# Patient Record
Sex: MALE | Race: WHITE | URBAN - METROPOLITAN AREA
[De-identification: names, ages, dates, MRNs, and addresses within clinical notes are randomized per-mention and may not be internally consistent; named-entity substitution may affect disease eponyms.]

---

## 2022-03-03 ENCOUNTER — OFFICE VISIT (OUTPATIENT)
Dept: SURGERY | Facility: CLINIC | Age: 32
End: 2022-03-03
Payer: COMMERCIAL

## 2022-03-03 VITALS
HEIGHT: 71 IN | WEIGHT: 162 LBS | TEMPERATURE: 97 F | BODY MASS INDEX: 22.68 KG/M2 | HEART RATE: 81 BPM | DIASTOLIC BLOOD PRESSURE: 78 MMHG | SYSTOLIC BLOOD PRESSURE: 126 MMHG

## 2022-03-03 DIAGNOSIS — K64.4 SKIN TAG OF PERIANAL REGION: ICD-10-CM

## 2022-03-03 DIAGNOSIS — L29.9 PRURITUS: Primary | ICD-10-CM

## 2022-03-03 PROCEDURE — 99204 OFFICE O/P NEW MOD 45 MIN: CPT | Performed by: COLON & RECTAL SURGERY

## 2022-03-03 PROCEDURE — 3008F BODY MASS INDEX DOCD: CPT | Performed by: COLON & RECTAL SURGERY

## 2022-03-03 PROCEDURE — 3078F DIAST BP <80 MM HG: CPT | Performed by: COLON & RECTAL SURGERY

## 2022-03-03 PROCEDURE — 46600 DIAGNOSTIC ANOSCOPY SPX: CPT | Performed by: COLON & RECTAL SURGERY

## 2022-03-03 PROCEDURE — 3074F SYST BP LT 130 MM HG: CPT | Performed by: COLON & RECTAL SURGERY

## 2022-09-22 ENCOUNTER — OFFICE VISIT (OUTPATIENT)
Facility: LOCATION | Age: 32
End: 2022-09-22

## 2022-09-22 VITALS — HEART RATE: 74 BPM | TEMPERATURE: 97 F

## 2022-09-22 DIAGNOSIS — K64.2 PROLAPSED INTERNAL HEMORRHOIDS, GRADE 3: ICD-10-CM

## 2022-09-22 DIAGNOSIS — L29.0 PRURITUS ANI: Primary | ICD-10-CM

## 2022-09-22 DIAGNOSIS — B36.9 FUNGAL DERMATITIS: ICD-10-CM

## 2022-09-22 PROCEDURE — 99214 OFFICE O/P EST MOD 30 MIN: CPT | Performed by: COLON & RECTAL SURGERY

## 2022-09-22 PROCEDURE — 46600 DIAGNOSTIC ANOSCOPY SPX: CPT | Performed by: COLON & RECTAL SURGERY

## 2022-09-22 RX ORDER — FLUCONAZOLE 200 MG/1
200 TABLET ORAL DAILY
Qty: 14 TABLET | Refills: 0 | Status: SHIPPED | OUTPATIENT
Start: 2022-09-22 | End: 2022-10-06

## 2022-09-23 PROBLEM — L29.0 PRURITUS ANI: Status: ACTIVE | Noted: 2022-09-23

## 2022-09-23 PROBLEM — B36.9 FUNGAL DERMATITIS: Status: ACTIVE | Noted: 2022-09-23

## 2022-09-23 PROBLEM — K64.2 PROLAPSED INTERNAL HEMORRHOIDS, GRADE 3: Status: ACTIVE | Noted: 2022-09-23

## 2022-09-23 NOTE — PATIENT INSTRUCTIONS
I am seeing this patient in consultation regarding very severe perianal itching. This patient has had multiple procedures and interventions on the anus. He presents at this time for further opinion regarding all of his issues. This patient states that 3 weeks ago he began with very severe pain and staining in the perianal skin. He is being treated with abdominal bloating by the gastroenterology service. They prescribed Xifaxan for irritable bowel syndrome. He states that that is about the time. His perianal itching began. He has a history of prior fissure with anal sphincteroplasty performed. He has a history of prior rubber band treatments to the internal hemorrhoids grade 3. He has had itching and redness around the anus on prior occasion. His stinging and \"ripping\" sensation happen while he is wiping. He has no current diarrhea or constipation. He has no bright red blood per rectum or melena. He has no mucus in the stool or narrowing of the stool. He has no current abdominal pain or distention. He has not suffered weight loss as a symptom. He is 29years old and being evaluated by gastroenterologist, I will leave colonic surveillance up to the gastroenterology service. He has not had any prior surgery for anal or rectal abscess. He has never had an anal fistula. He has never been diagnosed with Crohn's disease, ulcerative colitis, or irritable bowel syndrome. He has no issues with heart attack, stroke, cardiac arrhythmia, heart murmur, or heart valve replacement. He is not on any blood thinners. Clinical exam including anoscopy reveals him to have a very severe and advanced fungal dermatitis that extends approximately 1.5 inches in all directions from the anal verge. There are no external hemorrhoids. There is no current fissure or fistula. There are no abscesses. Prostate is normal.  He has had recurrence of his grade 3 internal hemorrhoids.   There is no proctitis or Crohn's disease. I explained all the above to the patient in detail. We will start with Diflucan 200 mg for 14-day course. This should improve all of his perianal itching and clear the fungal dermatitis. I have given him extensive dietary and hygienic modifications that should also improve his pruritus ani symptoms. I will see him again on October 11, 2022. We will note his progress. If he is having trouble clearing the pruritus ani, I recommend rubber band therapy to his hemorrhoids. This will take 4 visits spaced 2 weeks apart.

## 2022-10-11 ENCOUNTER — OFFICE VISIT (OUTPATIENT)
Facility: LOCATION | Age: 32
End: 2022-10-11
Payer: COMMERCIAL

## 2022-10-11 DIAGNOSIS — L29.0 PRURITUS ANI: Primary | ICD-10-CM

## 2022-10-11 DIAGNOSIS — K64.2 PROLAPSED INTERNAL HEMORRHOIDS, GRADE 3: ICD-10-CM

## 2022-10-11 DIAGNOSIS — B36.9 FUNGAL DERMATITIS: ICD-10-CM

## 2022-10-11 PROCEDURE — 99213 OFFICE O/P EST LOW 20 MIN: CPT | Performed by: COLON & RECTAL SURGERY

## 2022-10-11 RX ORDER — FLUCONAZOLE 200 MG/1
200 TABLET ORAL DAILY
Qty: 7 TABLET | Refills: 1 | Status: SHIPPED | OUTPATIENT
Start: 2022-10-11 | End: 2022-10-18

## 2022-10-11 NOTE — PATIENT INSTRUCTIONS
The patient presents for continued care and evaluation regarding his perianal fungal dermatitis and perianal pruritus. At the patient's last visit, we started him on a 14-day course of Diflucan regarding his perianal fungal dermatitis. The patient states he feels about 90% better at today's visit. He states he has finished his Diflucan. He states at baseline, he had some perianal discomfort. He states he is tolerating a general diet. He is having regular bowel movements. He denies diarrhea or constipation. He states he has not been following the pruritus ani diet strictly. He was unaware this would help alleviate his symptoms. Clinical exam was limited to an external exam only. He has no external hemorrhoids, fissures, fistulae or abscesses. He has a small remaining area of perianal fungal dermatitis at the 12:00 location. We will extend the patient's Diflucan in additional 7 days. We will provide him with 1 refill in case his symptoms persist.    I expressed the importance of avoiding highly acidic foods to help alleviate his symptoms. I have no further follow-up scheduled with this patient at this time. If his symptoms persist, he can make an additional follow-up appointment. All questions and concerns were addressed.

## 2022-12-06 ENCOUNTER — OFFICE VISIT (OUTPATIENT)
Facility: LOCATION | Age: 32
End: 2022-12-06
Payer: COMMERCIAL

## 2022-12-06 DIAGNOSIS — K64.1 GRADE II HEMORRHOIDS: Primary | ICD-10-CM

## 2022-12-06 PROCEDURE — 99213 OFFICE O/P EST LOW 20 MIN: CPT | Performed by: COLON & RECTAL SURGERY

## 2022-12-06 PROCEDURE — 46600 DIAGNOSTIC ANOSCOPY SPX: CPT | Performed by: COLON & RECTAL SURGERY

## 2022-12-07 PROBLEM — K64.1 GRADE II HEMORRHOIDS: Status: ACTIVE | Noted: 2022-12-07

## 2022-12-07 NOTE — PATIENT INSTRUCTIONS
The patient presents for continued care and evaluation regarding his perianal fungal dermatitis and perianal pruritus. In September, we started the patient on a 14-day course of Diflucan for his fungal dermatitis. Following his initial visit, he saw him again in October when he said he was feeling approximately 90% better. At today's visit, he states he is concerned about his hemorrhoids. He states he continues to have some rectal irritation following bowel movements. He denies any rectal bleeding or rectal pain. He denies mucus drainage from the rectum. He denies perianal pruritus. The patient states he is having regular bowel movements. He denies diarrhea or constipation. Denies hematochezia, melena or mucus in the stools. The patient states he has increased the fiber in his diet as well as his water intake, which is helped regulate his bowel movements. He states he has been treated with rubber band ligation for his hemorrhoids in the past.    Clinical exam of the rectum including anoscopy reveals the patient's fungal dermatitis and pruritus ani to have entirely healed. He has no external hemorrhoids, fissures, fistulae or abscesses. Anoscopy reveals grade 2 internal hemorrhoids and scarring from previous rubber band ligations. There is no evidence of proctitis or Crohn's disease. Digital rectal exam reveals no palpable masses. Prostate is normal.  Basal tone 4/4 and maximum squeeze pressure 4/4. I explained to the patient that his perianal skin has entirely healed. He may continue to have some discomfort in the region for as long as 6 months following resolution of his fungal dermatitis and pruritus ani. The patient's hemorrhoids are fairly small at this time. They would not be amenable to rubber band ligation. I informed the patient that if his hemorrhoids become more symptomatic in the future, then he may see me for rubber band ligation.     I have no further follow-up scheduled with this patient. He may see me on an as-needed basis in the future.

## 2024-09-25 ENCOUNTER — LAB SERVICES (OUTPATIENT)
Dept: LAB | Age: 34
End: 2024-09-25

## 2024-09-25 ENCOUNTER — WALK IN (OUTPATIENT)
Dept: URGENT CARE | Age: 34
End: 2024-09-25

## 2024-09-25 VITALS
HEIGHT: 71 IN | RESPIRATION RATE: 18 BRPM | DIASTOLIC BLOOD PRESSURE: 73 MMHG | HEART RATE: 71 BPM | SYSTOLIC BLOOD PRESSURE: 118 MMHG | WEIGHT: 191.58 LBS | OXYGEN SATURATION: 99 % | TEMPERATURE: 97.9 F | BODY MASS INDEX: 26.82 KG/M2

## 2024-09-25 DIAGNOSIS — R30.0 DYSURIA: Primary | ICD-10-CM

## 2024-09-25 DIAGNOSIS — Z72.51 HIGH RISK SEXUAL BEHAVIOR, UNSPECIFIED TYPE: ICD-10-CM

## 2024-09-25 DIAGNOSIS — R30.0 DYSURIA: ICD-10-CM

## 2024-09-25 PROBLEM — K64.1 GRADE II HEMORRHOIDS: Status: ACTIVE | Noted: 2022-12-07

## 2024-09-25 PROBLEM — B36.9 FUNGAL DERMATITIS: Status: ACTIVE | Noted: 2022-09-23

## 2024-09-25 PROBLEM — L29.0 PRURITUS ANI: Status: ACTIVE | Noted: 2022-09-23

## 2024-09-25 PROBLEM — K64.2 PROLAPSED INTERNAL HEMORRHOIDS, GRADE 3: Status: ACTIVE | Noted: 2022-09-23

## 2024-09-25 LAB
APPEARANCE, POC: ABNORMAL
BILIRUBIN, POC: NEGATIVE
COLOR, POC: YELLOW
GLUCOSE UR-MCNC: NEGATIVE MG/DL
KETONES, POC: NEGATIVE MG/DL
NITRITE, POC: NEGATIVE
OCCULT BLOOD, POC: NEGATIVE
PH UR: 6 [PH] (ref 5–7)
PROT UR-MCNC: NEGATIVE MG/DL
SP GR UR: 1.02 (ref 1–1.03)
UROBILINOGEN UR-MCNC: 0.2 MG/DL (ref 0–1)
WBC (LEUKOCYTE) ESTERASE, POC: ABNORMAL

## 2024-09-25 PROCEDURE — 87389 HIV-1 AG W/HIV-1&-2 AB AG IA: CPT | Performed by: CLINICAL MEDICAL LABORATORY

## 2024-09-25 PROCEDURE — 86592 SYPHILIS TEST NON-TREP QUAL: CPT | Performed by: CLINICAL MEDICAL LABORATORY

## 2024-09-25 PROCEDURE — 36415 COLL VENOUS BLD VENIPUNCTURE: CPT | Performed by: PHYSICIAN ASSISTANT

## 2024-09-25 RX ORDER — L.ACID/L.CASEI/B.BIF/B.LON/FOS 2B CELL-50
1 CAPSULE ORAL DAILY
COMMUNITY

## 2024-09-25 RX ORDER — CREATINE 100 %
POWDER (GRAM) MISCELLANEOUS
COMMUNITY

## 2024-09-25 ASSESSMENT — PAIN SCALES - GENERAL: PAINLEVEL: 0

## 2024-09-26 ENCOUNTER — TELEPHONE (OUTPATIENT)
Dept: URGENT CARE | Age: 34
End: 2024-09-26

## 2024-09-26 LAB
BACTERIA UR CULT: NO GROWTH
C TRACH RRNA UR QL NAA+PROBE: POSITIVE
HIV 1+2 AB+HIV1 P24 AG SERPL QL IA: NONREACTIVE
Lab: ABNORMAL
N GONORRHOEA RRNA UR QL NAA+PROBE: NEGATIVE
RPR SER QL: NONREACTIVE
T VAGINALIS RRNA UR QL NAA+PROBE: NEGATIVE

## 2024-09-26 RX ORDER — DOXYCYCLINE HYCLATE 100 MG
100 TABLET ORAL 2 TIMES DAILY
Qty: 14 TABLET | Refills: 0 | Status: SHIPPED | OUTPATIENT
Start: 2024-09-26 | End: 2024-10-03

## 2024-09-27 ENCOUNTER — TELEPHONE (OUTPATIENT)
Dept: URGENT CARE | Age: 34
End: 2024-09-27

## 2024-10-16 ENCOUNTER — WALK IN (OUTPATIENT)
Dept: URGENT CARE | Age: 34
End: 2024-10-16

## 2024-10-16 VITALS
TEMPERATURE: 98.6 F | HEIGHT: 71 IN | WEIGHT: 196.54 LBS | BODY MASS INDEX: 27.52 KG/M2 | OXYGEN SATURATION: 98 % | SYSTOLIC BLOOD PRESSURE: 133 MMHG | HEART RATE: 83 BPM | DIASTOLIC BLOOD PRESSURE: 75 MMHG | RESPIRATION RATE: 18 BRPM

## 2024-10-16 DIAGNOSIS — Z86.19 HISTORY OF CHLAMYDIA INFECTION: ICD-10-CM

## 2024-10-16 DIAGNOSIS — R30.0 DYSURIA: Primary | ICD-10-CM

## 2024-10-16 LAB
APPEARANCE, POC: NORMAL
BILIRUB UR QL STRIP: NEGATIVE
COLOR UR: YELLOW
GLUCOSE UR-MCNC: NEGATIVE MG/DL
HGB UR QL STRIP: NEGATIVE
INTERNAL PROCEDURAL CONTROLS ACCEPTABLE: YES
KETONES UR STRIP-MCNC: NEGATIVE MG/DL
LEUKOCYTE ESTERASE UR QL STRIP: NEGATIVE
NITRITE UR QL STRIP: NEGATIVE
PH UR: 5.5 [PH] (ref 5–7)
PROT UR-MCNC: NEGATIVE MG/DL
SP GR UR: 1.01 (ref 1–1.03)
TEST LOT EXPIRATION DATE: NORMAL
TEST LOT NUMBER: NORMAL
UROBILINOGEN UR-MCNC: 0.2 MG/DL (ref 0–1)

## 2024-10-16 PROCEDURE — 99213 OFFICE O/P EST LOW 20 MIN: CPT | Performed by: PHYSICIAN ASSISTANT

## 2024-10-16 PROCEDURE — 81002 URINALYSIS NONAUTO W/O SCOPE: CPT | Performed by: PHYSICIAN ASSISTANT

## 2024-10-16 ASSESSMENT — ENCOUNTER SYMPTOMS
VOMITING: 0
BACK PAIN: 0
FEVER: 0
NAUSEA: 0
ABDOMINAL PAIN: 0
CHILLS: 0

## 2024-10-16 ASSESSMENT — PAIN SCALES - GENERAL: PAINLEVEL: 0

## 2024-10-17 ENCOUNTER — TELEPHONE (OUTPATIENT)
Dept: URGENT CARE | Age: 34
End: 2024-10-17

## 2024-10-17 LAB — RAINBOW EXTRA TUBES HOLD SPECIMEN: NORMAL

## 2024-10-20 LAB
M GENITALIUM DNA SPEC QL NAA+PROBE: NOT DETECTED
M HOMINIS DNA SPEC QL NAA+PROBE: NOT DETECTED
SPECIMEN SOURCE: NORMAL
U PARVUM DNA SPEC QL NAA+PROBE: NOT DETECTED
U UREALYTICUM DNA SPEC QL NAA+PROBE: NOT DETECTED

## 2024-10-29 ENCOUNTER — LAB SERVICES (OUTPATIENT)
Dept: LAB | Age: 34
End: 2024-10-29

## 2024-10-29 ENCOUNTER — APPOINTMENT (OUTPATIENT)
Dept: INTERNAL MEDICINE | Age: 34
End: 2024-10-29

## 2024-10-29 VITALS
WEIGHT: 193 LBS | DIASTOLIC BLOOD PRESSURE: 88 MMHG | OXYGEN SATURATION: 97 % | HEART RATE: 86 BPM | RESPIRATION RATE: 16 BRPM | HEIGHT: 71 IN | SYSTOLIC BLOOD PRESSURE: 128 MMHG | TEMPERATURE: 98.1 F | BODY MASS INDEX: 27.02 KG/M2

## 2024-10-29 DIAGNOSIS — L65.9 HAIR LOSS: ICD-10-CM

## 2024-10-29 DIAGNOSIS — A74.9 CHLAMYDIA INFECTION: ICD-10-CM

## 2024-10-29 DIAGNOSIS — Z23 NEED FOR IMMUNIZATION AGAINST INFLUENZA: Primary | ICD-10-CM

## 2024-10-29 DIAGNOSIS — Z23 NEED FOR COVID-19 VACCINE: ICD-10-CM

## 2024-10-29 PROCEDURE — 87491 CHLMYD TRACH DNA AMP PROBE: CPT | Performed by: CLINICAL MEDICAL LABORATORY

## 2024-10-29 RX ORDER — MINOXIDIL 2.5 MG/1
2.5 TABLET ORAL DAILY
Qty: 30 TABLET | Refills: 3 | Status: SHIPPED | OUTPATIENT
Start: 2024-10-29

## 2024-10-29 ASSESSMENT — PATIENT HEALTH QUESTIONNAIRE - PHQ9
SUM OF ALL RESPONSES TO PHQ9 QUESTIONS 1 AND 2: 0
2. FEELING DOWN, DEPRESSED OR HOPELESS: NOT AT ALL
CLINICAL INTERPRETATION OF PHQ2 SCORE: NO FURTHER SCREENING NEEDED
SUM OF ALL RESPONSES TO PHQ9 QUESTIONS 1 AND 2: 0
1. LITTLE INTEREST OR PLEASURE IN DOING THINGS: NOT AT ALL

## 2024-10-30 LAB
C TRACH RRNA UR QL NAA+PROBE: NEGATIVE
Lab: NORMAL

## (undated) NOTE — LETTER
22    Patient: Maryanne Goltz  : 1/10/1990 Visit date: 2022    Dear  Virginia Ocampo    Thank you for referring Maryanne Goltz to my practice. Please find my assessment and plan below. Assessment   Grade II hemorrhoids  (primary encounter diagnosis)    Plan   The patient presents for continued care and evaluation regarding his perianal fungal dermatitis and perianal pruritus. In September, we started the patient on a 14-day course of Diflucan for his fungal dermatitis. Following his initial visit, he saw him again in October when he said he was feeling approximately 90% better. At today's visit, he states he is concerned about his hemorrhoids. He states he continues to have some rectal irritation following bowel movements. He denies any rectal bleeding or rectal pain. He denies mucus drainage from the rectum. He denies perianal pruritus. The patient states he is having regular bowel movements. He denies diarrhea or constipation. Denies hematochezia, melena or mucus in the stools. The patient states he has increased the fiber in his diet as well as his water intake, which is helped regulate his bowel movements. He states he has been treated with rubber band ligation for his hemorrhoids in the past.    Clinical exam of the rectum including anoscopy reveals the patient's fungal dermatitis and pruritus ani to have entirely healed. He has no external hemorrhoids, fissures, fistulae or abscesses. Anoscopy reveals grade 2 internal hemorrhoids and scarring from previous rubber band ligations. There is no evidence of proctitis or Crohn's disease. Digital rectal exam reveals no palpable masses. Prostate is normal.  Basal tone 4/4 and maximum squeeze pressure 4/4. I explained to the patient that his perianal skin has entirely healed. He may continue to have some discomfort in the region for as long as 6 months following resolution of his fungal dermatitis and pruritus ani.     The patient's hemorrhoids are fairly small at this time. They would not be amenable to rubber band ligation. I informed the patient that if his hemorrhoids become more symptomatic in the future, then he may see me for rubber band ligation. I have no further follow-up scheduled with this patient. He may see me on an as-needed basis in the future.        Sincerely,       Jacquie Oseguera MD   CC: No Recipients

## (undated) NOTE — LETTER
22    Patient: Erik Killian  : 1/10/1990 Visit date: 2022    Dear  Anderson Amador    Thank you for referring Erik Killian to my practice. Please find my assessment and plan below. Assessment   Pruritus ani  (primary encounter diagnosis)  Fungal dermatitis  Prolapsed internal hemorrhoids, grade 3      Plan   I am seeing this patient in consultation regarding very severe perianal itching. This patient has had multiple procedures and interventions on the anus. He presents at this time for further opinion regarding all of his issues. This patient states that 3 weeks ago he began with very severe pain and staining in the perianal skin. He is being treated with abdominal bloating by the gastroenterology service. They prescribed Xifaxan for irritable bowel syndrome. He states that that is about the time. His perianal itching began. He has a history of prior fissure with anal sphincteroplasty performed. He has a history of prior rubber band treatments to the internal hemorrhoids grade 3. He has had itching and redness around the anus on prior occasion. His stinging and \"ripping\" sensation happen while he is wiping. He has no current diarrhea or constipation. He has no bright red blood per rectum or melena. He has no mucus in the stool or narrowing of the stool. He has no current abdominal pain or distention. He has not suffered weight loss as a symptom. He is 29years old and being evaluated by gastroenterologist, I will leave colonic surveillance up to the gastroenterology service. He has not had any prior surgery for anal or rectal abscess. He has never had an anal fistula. He has never been diagnosed with Crohn's disease, ulcerative colitis, or irritable bowel syndrome. He has no issues with heart attack, stroke, cardiac arrhythmia, heart murmur, or heart valve replacement. He is not on any blood thinners.     Clinical exam including anoscopy reveals him to have a very severe and advanced fungal dermatitis that extends approximately 1.5 inches in all directions from the anal verge. There are no external hemorrhoids. There is no current fissure or fistula. There are no abscesses. Prostate is normal.  He has had recurrence of his grade 3 internal hemorrhoids. There is no proctitis or Crohn's disease. I explained all the above to the patient in detail. We will start with Diflucan 200 mg for 14-day course. This should improve all of his perianal itching and clear the fungal dermatitis. I have given him extensive dietary and hygienic modifications that should also improve his pruritus ani symptoms. I will see him again on October 11, 2022. We will note his progress. If he is having trouble clearing the pruritus ani, I recommend rubber band therapy to his hemorrhoids. This will take 4 visits spaced 2 weeks apart.       Sincerely,       Jeffy Greene MD   CC: No Recipients